# Patient Record
Sex: FEMALE | URBAN - METROPOLITAN AREA
[De-identification: names, ages, dates, MRNs, and addresses within clinical notes are randomized per-mention and may not be internally consistent; named-entity substitution may affect disease eponyms.]

---

## 2023-04-14 ENCOUNTER — OFFICE VISIT (OUTPATIENT)
Dept: URBAN - METROPOLITAN AREA CLINIC 30 | Facility: CLINIC | Age: 48
End: 2023-04-14
Payer: COMMERCIAL

## 2023-04-14 DIAGNOSIS — H43.813 VITREOUS DEGENERATION, BILATERAL: ICD-10-CM

## 2023-04-14 DIAGNOSIS — D31.01 BENIGN NEOPLASM OF RIGHT CONJUNCTIVA: Primary | ICD-10-CM

## 2023-04-14 PROCEDURE — 92285 EXTERNAL OCULAR PHOTOGRAPHY: CPT

## 2023-04-14 PROCEDURE — 92134 CPTRZ OPH DX IMG PST SGM RTA: CPT

## 2023-04-14 PROCEDURE — 92004 COMPRE OPH EXAM NEW PT 1/>: CPT

## 2023-04-14 ASSESSMENT — VISUAL ACUITY
OS: 20/20
OD: 20/20

## 2023-04-14 ASSESSMENT — INTRAOCULAR PRESSURE
OS: 14
OD: 16

## 2023-04-14 ASSESSMENT — KERATOMETRY
OD: 44.88
OS: 45.13

## 2023-04-14 NOTE — IMPRESSION/PLAN
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 Plan: Right inferior palpebral conj pigmentation, pt noted first time x 3 years ago. DDX nevus vs. KEY vs. melanoma. Believes it may have grown slightly. No pain, ulceration or alteration of normal lid anatomy. No personal or family history of skin cancer. Pt denies smoking. Pt does not wear sunglasses outside - recommend pt begin sunglasses while outdoors. Photos taken today.  F/u 3-4 months [place] lesion evaluation